# Patient Record
Sex: FEMALE | Race: WHITE
[De-identification: names, ages, dates, MRNs, and addresses within clinical notes are randomized per-mention and may not be internally consistent; named-entity substitution may affect disease eponyms.]

---

## 2017-12-14 ENCOUNTER — HOSPITAL ENCOUNTER (INPATIENT)
Dept: HOSPITAL 38 - CC.MS | Age: 82
LOS: 7 days | Discharge: HOME | DRG: 308 | End: 2017-12-21
Attending: GENERAL PRACTICE | Admitting: GENERAL PRACTICE
Payer: MEDICARE

## 2017-12-14 DIAGNOSIS — I48.0: Primary | ICD-10-CM

## 2017-12-14 DIAGNOSIS — I63.8: ICD-10-CM

## 2017-12-14 DIAGNOSIS — I10: ICD-10-CM

## 2017-12-14 DIAGNOSIS — R42: ICD-10-CM

## 2017-12-14 DIAGNOSIS — R53.1: ICD-10-CM

## 2017-12-14 DIAGNOSIS — E83.42: ICD-10-CM

## 2017-12-14 DIAGNOSIS — Z79.899: ICD-10-CM

## 2017-12-14 DIAGNOSIS — Z79.01: ICD-10-CM

## 2017-12-14 DIAGNOSIS — E78.5: ICD-10-CM

## 2017-12-14 LAB
CHLORIDE SERPL-SCNC: 107 MEQ/L (ref 98–106)
SODIUM SERPL-SCNC: 144 MEQ/L (ref 136–145)

## 2017-12-14 NOTE — PCM.SN
- Free Text/Narrative


Note: 





Head CT negative per radiologist report. Patient currently in atrial 

fibrillation. Pulse has improved since IV metoprolol dose. Will start coumadin 

10 mg STAT and recheck INR tomorrow. Will bridge with Lovenox 40 mg BID.

## 2017-12-15 LAB
CHLORIDE SERPL-SCNC: 110 MEQ/L (ref 98–106)
SODIUM SERPL-SCNC: 145 MEQ/L (ref 136–145)

## 2017-12-15 NOTE — PN
DATE:  12/15/2017

 

S:  This is an elderly female who came, apparently had a stroke down in [place],

came in to see me and she was in atrial fibrillation.  Telemetry during the

night showed rapid ventricular rate with some bradycardia.

 

O:  NECK:  Supple.

CHEST:  Clear.

CARDIAC:  Regular at present.

EXTREMITIES:  No edema.

 

ASSESSMENT:  CEREBROVASCULAR ACCIDENT, ATRIAL FIBRILLATION.  PENDING ARE CAT

SCANS AND THEN WE WILL START ANTICOAGULATION.  LOW ON MAGNESIUM, SO REPLENISH

THAT THIS MORNING.

 

GEORGE/SHILPA

DD:  12/15/2017 08:23:25

DT:  12/15/2017 08:36:28

Job #:  [id number]

## 2017-12-16 LAB
CHLORIDE SERPL-SCNC: 110 MEQ/L (ref 98–106)
SODIUM SERPL-SCNC: 144 MEQ/L (ref 136–145)

## 2017-12-16 NOTE — PCM.PN
- General Info


Date of Service: 12/16/17


Functional Status: Reports: Pain Controlled, Tolerating Diet, Ambulating, 

Urinating





- Review of Systems


General: Reports: No Symptoms


HEENT: Reports: No Symptoms


Pulmonary: Reports: No Symptoms


Cardiovascular: Reports: No Symptoms


Gastrointestinal: Reports: No Symptoms


Genitourinary: Reports: No Symptoms


Musculoskeletal: Reports: No Symptoms


Skin: Reports: No Symptoms


Neurological: Reports: No Symptoms


Psychiatric: Reports: No Symptoms





- Patient Data


Vitals - Most Recent: 


 Last Vital Signs











Temp  98.1 F   12/16/17 07:32


 


Pulse  64   12/16/17 07:34


 


Resp  20   12/16/17 07:32


 


BP  136/57 L  12/16/17 07:35


 


Pulse Ox  96   12/16/17 07:32











Weight - Most Recent: 144 lb 6.4 oz


I&O - Last 24 Hours: 


 Intake & Output











 12/15/17 12/16/17 12/16/17





 22:59 06:59 14:59


 


Intake Total   400


 


Balance   400











Lab Results Last 24 Hours: 


 Laboratory Results - last 24 hr











  12/16/17 12/16/17 12/16/17 Range/Units





  05:00 07:00 07:00 


 


WBC  3.9 L    (5.0-10.0)  10^3/uL


 


RBC  3.14 L    (4.00-5.50)  10^6/uL


 


Hgb  10.3 L    (12.0-16.0)  g/dL


 


Hct  31.1 L    (37.0-47.0)  %


 


MCV  99.0 H    (82.0-94.0)  fL


 


MCH  32.8 H    (27.0-32.0)  pg


 


MCHC  33.1    (33.0-38.0)  g/dL


 


RDW Coeff of Shree  12.9    (11.0-15.0)  %


 


Plt Count  121 L    (150-400)  10^3/uL


 


Neut % (Auto)  54.2    (35-85)  %


 


Lymph % (Auto)  26.4    (10-55)  %


 


Mono % (Auto)  12.9    (0-16)  %


 


Eos % (Auto)  5.7 H    (0-5)  %


 


Baso % (Auto)  0.8    (0-3)  %


 


Neut # (Auto)  2.10    (1.80-7.00)  10^3/uL


 


Lymph # (Auto)  1.02    (1.00-4.80)  10^3/uL


 


Mono # (Auto)  0.50    (0.00-0.80)  10^3/uL


 


Eos # (Auto)  0.22    (0.00-0.45)  10^3/uL


 


Baso # (Auto)  0.03    10^3/uL


 


PT   21.9 H   (9.7-12.3)  SEC


 


INR   1.99 H   (0.92-1.18)  


 


Sodium    144  (136-145)  mEq/L


 


Potassium    3.6  (3.5-5.0)  mEq/L


 


Chloride    110 H  ()  mEq/L


 


Carbon Dioxide    30  (21-32)  mmol/L


 


BUN    23 H  (7-18)  mg/dL


 


Creatinine    1.4 H  (0.6-1.0)  mg/dL


 


Est Cr Clr Drug Dosing    23.24  mL/min


 


Estimated GFR (MDRD)    36 L  (>=60)  mL/min


 


Glucose    99  (75-99)  mg/dL


 


Calcium    8.6  (8.4-10.1)  mg/dL











Med Orders - Current: 


 Current Medications





Acetaminophen (Tylenol)  650 mg PO Q4H PRN


   PRN Reason: Pain (Mild 1-3)/fever


Amlodipine Besylate (Norvasc)  5 mg PO BID [facility]


   Last Admin: 12/16/17 07:35 Dose:  5 mg


Atorvastatin Calcium (Lipitor)  40 mg PO BEDTIME [facility]


   Last Admin: 12/15/17 20:08 Dose:  40 mg


Calcium Carbonate/Glycine (Tums)  500 mg PO QID PRN


   PRN Reason: Dyspepsia


Enoxaparin Sodium (Lovenox)  30 mg SUBCUT BID [facility]


   Last Admin: 12/16/17 07:34 Dose:  30 mg


Magnesium Hydroxide (Milk Of Magnesia)  30 ml PO Q12H PRN


   PRN Reason: Constipation


Magnesium Oxide (Magnesium Oxide)  250 mg PO BIDM [facility]


   Last Admin: 12/16/17 07:34 Dose:  250 mg


Metoprolol Tartrate (Lopressor)  50 mg PO BID [facility]


   Last Admin: 12/16/17 07:34 Dose:  50 mg


Ondansetron HCl (Zofran)  4 mg IV Q6H PRN


   PRN Reason: Nausea/Vomiting


Pantoprazole Sodium (Protonix***)  40 mg PO 0700 [facility]


   Last Admin: 12/16/17 06:54 Dose:  40 mg


Sodium Chloride (Saline Flush)  10 ml FLUSH ASDIRECTED PRN


   PRN Reason: Keep Vein Open


   Last Admin: 12/15/17 20:09 Dose:  10 ml


Temazepam (Restoril)  15 mg PO BEDTIME PRN


   PRN Reason: Sleep


Warfarin Sodium (Coumadin)  5 mg PO DAILY@1200 [facility]





Discontinued Medications





Lactated Ringer's (Ringers, Lactated)  1,000 mls @ 100 mls/hr IV ONETIME ONE


   Stop: 12/15/17 01:59


   Last Admin: 12/14/17 16:29 Dose:  100 mls/hr


Magnesium Sulfate/Dextrose 2 (gm/ Premix)  200 mls @ 100 mls/hr IV ONETIME ONE


   Stop: 12/15/17 10:22


   Last Admin: 12/15/17 09:14 Dose:  100 mls/hr


Sodium Chloride (Normal Saline)  1,000 mls @ 75 mls/hr IV ASDIRECTED [facility]


Iopamidol (Isovue-370 (76%))  100 ml IVPUSH ONETIME ONE


   Stop: 12/14/17 16:07


   Last Admin: 12/14/17 16:20 Dose:  100 ml


Metoprolol Tartrate (Lopressor)  2.5 mg IVPUSH ONETIME ONE


   Stop: 12/14/17 14:43


   Last Admin: 12/14/17 15:24 Dose:  2.5 mg


Warfarin Sodium (Coumadin)  10 mg PO ONETIME ONE


   Stop: 12/14/17 17:49


   Last Admin: 12/14/17 20:00 Dose:  10 mg


Warfarin Sodium (Coumadin)  10 mg PO ONETIME ONE


   Stop: 12/15/17 08:42


   Last Admin: 12/15/17 09:14 Dose:  10 mg











- Exam


General: Alert, Oriented, Cooperative, No Acute Distress


Neck: Supple


Lungs: Clear to Auscultation, Normal Respiratory Effort


Cardiovascular: Regular Rate, Regular Rhythm, No Murmurs


GI/Abdominal Exam: Soft, Non-Tender, No Organomegaly, No Distention, No 

Abnormal Bruit, No Mass


Back Exam: Normal Inspection, Full Range of Motion


Extremities: Normal Inspection, Normal Range of Motion, Non-Tender, No Pedal 

Edema, Normal Capillary Refill


Peripheral Pulses: 2+: Radial (L), Radial (R), Posterior Tibial (L), Posterior 

Tibial (R)


Skin: Warm, Dry, Intact


Neurological: No New Focal Deficit, Normal Gait, Normal Speech, Strength Equal 

Bilateral, Sensation Intact, Cranial Nerves Intact


Psy/Mental Status: Alert, Normal Affect, Normal Mood





- Problem List Review


Problem List Initiated/Reviewed/Updated: Yes





- My Orders


Last 24 Hours: 


My Active Orders





12/16/17 07:00


MAGNESIUM [CHEM] Stat 





12/16/17 12:00


Warfarin [Coumadin]   5 mg PO DAILY@1200 





12/17/17 05:00


BMP [BASIC METABOLIC PANEL,BMP] [CHEM] DAILY 


CBC WITH AUTO DIFF [HEME] DAILY 


INR,PT,PROTHROMBIN TIME [COAG] DAILY 


MAGNESIUM [CHEM] Routine 





12/18/17 05:00


BMP [BASIC METABOLIC PANEL,BMP] [CHEM] DAILY 


CBC WITH AUTO DIFF [HEME] DAILY 


INR,PT,PROTHROMBIN TIME [COAG] DAILY 














- Plan


Plan:: 





This patient is an 85 year old female that was admitted for a-fib, stroke, 

generalized weakness. Patient today is alert and oriented. Patient denies any 

pain, ha, dizziness, n, v, d, f, cp, soa, abd pain, urinary/bowel changes. 

rash. Will continue to admit until therapeutic INR. Patient is currently NSR 

rate of 54. Patient plan is to see PCP on Monday. Patient is alert and oriented

, no unilateral weaknesses. Stable.

## 2017-12-17 LAB
CHLORIDE SERPL-SCNC: 111 MEQ/L (ref 98–106)
SODIUM SERPL-SCNC: 147 MEQ/L (ref 136–145)

## 2017-12-17 NOTE — PCM.PN
- General Info


Date of Service: 12/17/17


Functional Status: Reports: Pain Controlled, Tolerating Diet, Ambulating, 

Urinating





- Review of Systems


General: Reports: No Symptoms


HEENT: Reports: No Symptoms


Pulmonary: Reports: No Symptoms


Cardiovascular: Reports: No Symptoms


Gastrointestinal: Reports: No Symptoms


Genitourinary: Reports: No Symptoms


Musculoskeletal: Reports: No Symptoms


Skin: Reports: No Symptoms


Neurological: Reports: No Symptoms


Psychiatric: Reports: No Symptoms





- Patient Data


Vitals - Most Recent: 


 Last Vital Signs











Temp  97.2 F   12/17/17 07:36


 


Pulse  63   12/17/17 07:38


 


Resp  20   12/17/17 07:36


 


BP  130/55 L  12/17/17 07:39


 


Pulse Ox  96   12/17/17 07:36











Weight - Most Recent: 144 lb 6.4 oz


I&O - Last 24 Hours: 


 Intake & Output











 12/16/17 12/17/17 12/17/17





 22:59 06:59 14:59


 


Intake Total   600


 


Balance   600











Lab Results Last 24 Hours: 


 Laboratory Results - last 24 hr











  12/17/17 12/17/17 12/17/17 Range/Units





  07:30 07:30 07:30 


 


WBC  3.5 L    (5.0-10.0)  10^3/uL


 


RBC  3.31 L    (4.00-5.50)  10^6/uL


 


Hgb  10.7 L    (12.0-16.0)  g/dL


 


Hct  32.7 L    (37.0-47.0)  %


 


MCV  98.8 H    (82.0-94.0)  fL


 


MCH  32.3 H    (27.0-32.0)  pg


 


MCHC  32.7 L    (33.0-38.0)  g/dL


 


RDW Coeff of Shree  12.9    (11.0-15.0)  %


 


Plt Count  119 L    (150-400)  10^3/uL


 


Neut % (Auto)  47.7    (35-85)  %


 


Lymph % (Auto)  29.7    (10-55)  %


 


Mono % (Auto)  14.4    (0-16)  %


 


Eos % (Auto)  6.5 H    (0-5)  %


 


Baso % (Auto)  1.7    (0-3)  %


 


Neut # (Auto)  1.69 L    (1.80-7.00)  10^3/uL


 


Lymph # (Auto)  1.05    (1.00-4.80)  10^3/uL


 


Mono # (Auto)  0.51    (0.00-0.80)  10^3/uL


 


Eos # (Auto)  0.23    (0.00-0.45)  10^3/uL


 


Baso # (Auto)  0.06    10^3/uL


 


PT   40.1 H   (9.7-12.3)  SEC


 


INR   3.57 H   (0.92-1.18)  


 


Sodium    147 H  (136-145)  mEq/L


 


Potassium    4.2  (3.5-5.0)  mEq/L


 


Chloride    111 H  ()  mEq/L


 


Carbon Dioxide    30  (21-32)  mmol/L


 


BUN    21 H  (7-18)  mg/dL


 


Creatinine    1.5 H  (0.6-1.0)  mg/dL


 


Est Cr Clr Drug Dosing    21.69  mL/min


 


Estimated GFR (MDRD)    33 L  (>=60)  mL/min


 


Glucose    102 H  (75-99)  mg/dL


 


Calcium    8.7  (8.4-10.1)  mg/dL


 


Magnesium    1.8  (1.8-2.4)  mg/dL











Med Orders - Current: 


 Current Medications





Acetaminophen (Tylenol)  650 mg PO Q4H PRN


   PRN Reason: Pain (Mild 1-3)/fever


   Last Admin: 12/16/17 19:48 Dose:  650 mg


Amlodipine Besylate (Norvasc)  5 mg PO BID Count includes the [facility]


   Last Admin: 12/17/17 07:39 Dose:  5 mg


Atorvastatin Calcium (Lipitor)  40 mg PO BEDTIME Count includes the [facility]


   Last Admin: 12/16/17 19:48 Dose:  40 mg


Calcium Carbonate/Glycine (Tums)  500 mg PO QID PRN


   PRN Reason: Dyspepsia


Enoxaparin Sodium (Lovenox)  30 mg SUBCUT BID Count includes the [facility]


   Last Admin: 12/17/17 07:38 Dose:  30 mg


Sodium Chloride (Normal Saline)  500 mls @ 125 mls/hr IV .BOLUS Count includes the [facility]


   Last Admin: 12/17/17 12:13 Dose:  125 mls/hr


Magnesium Hydroxide (Milk Of Magnesia)  30 ml PO Q12H PRN


   PRN Reason: Constipation


Magnesium Oxide (Magnesium Oxide)  250 mg PO BIDM Count includes the [facility]


   Last Admin: 12/17/17 07:38 Dose:  250 mg


Metoprolol Tartrate (Lopressor)  50 mg PO BID Count includes the [facility]


   Last Admin: 12/17/17 07:38 Dose:  50 mg


Ondansetron HCl (Zofran)  4 mg IV Q6H PRN


   PRN Reason: Nausea/Vomiting


Pantoprazole Sodium (Protonix***)  40 mg PO 0700 Count includes the [facility]


   Last Admin: 12/17/17 06:28 Dose:  40 mg


Sodium Chloride (Saline Flush)  10 ml FLUSH ASDIRECTED PRN


   PRN Reason: Keep Vein Open


   Last Admin: 12/15/17 20:09 Dose:  10 ml


Temazepam (Restoril)  15 mg PO BEDTIME PRN


   PRN Reason: Sleep


   Last Admin: 12/16/17 19:48 Dose:  15 mg


Warfarin Sodium (Coumadin)  2.5 mg PO ONETIME Count includes the [facility]


   Last Admin: 12/17/17 12:11 Dose:  2.5 mg





Discontinued Medications





Lactated Ringer's (Ringers, Lactated)  1,000 mls @ 100 mls/hr IV ONETIME ONE


   Stop: 12/15/17 01:59


   Last Admin: 12/14/17 16:29 Dose:  100 mls/hr


Magnesium Sulfate/Dextrose 2 (gm/ Premix)  200 mls @ 100 mls/hr IV ONETIME ONE


   Stop: 12/15/17 10:22


   Last Admin: 12/15/17 09:14 Dose:  100 mls/hr


Sodium Chloride (Normal Saline)  1,000 mls @ 75 mls/hr IV ASDIRECTED [facility]


Iopamidol (Isovue-370 (76%))  100 ml IVPUSH ONETIME ONE


   Stop: 12/14/17 16:07


   Last Admin: 12/14/17 16:20 Dose:  100 ml


Metoprolol Tartrate (Lopressor)  2.5 mg IVPUSH ONETIME ONE


   Stop: 12/14/17 14:43


   Last Admin: 12/14/17 15:24 Dose:  2.5 mg


Warfarin Sodium (Coumadin)  10 mg PO ONETIME ONE


   Stop: 12/14/17 17:49


   Last Admin: 12/14/17 20:00 Dose:  10 mg


Warfarin Sodium (Coumadin)  10 mg PO ONETIME ONE


   Stop: 12/15/17 08:42


   Last Admin: 12/15/17 09:14 Dose:  10 mg


Warfarin Sodium (Coumadin)  5 mg PO DAILY@1200 Count includes the [facility]


   Last Admin: 12/16/17 11:59 Dose:  5 mg











- Exam


General: Alert, Oriented, Cooperative, No Acute Distress


Neck: Supple, Trachea Midline, No JVD


Lungs: Clear to Auscultation, Normal Respiratory Effort


Cardiovascular: Regular Rate, Regular Rhythm, No Murmurs


GI/Abdominal Exam: Soft, Non-Tender, No Organomegaly, No Distention


Back Exam: Normal Inspection, Full Range of Motion


Extremities: Normal Inspection, Normal Range of Motion, Non-Tender, No Pedal 

Edema, Normal Capillary Refill.  No: Pedal Edema


Peripheral Pulses: 2+: Radial (L), Radial (R), Posterior Tibial (L), Posterior 

Tibial (R), Dorsalis Pedis (L), Dorsalis Pedis (R)


Skin: Warm, Dry, Intact


Neurological: No New Focal Deficit, Normal Gait, Normal Speech


Psy/Mental Status: Alert, Normal Affect, Normal Mood





- Problem List Review


Problem List Initiated/Reviewed/Updated: Yes





- My Orders


Last 24 Hours: 


My Active Orders





12/17/17 11:45


Sodium Chloride 0.9% [Normal Saline] 500 ml IV .BOLUS 


Warfarin [Coumadin]   2.5 mg PO ONETIME 





12/18/17 05:00


BMP [BASIC METABOLIC PANEL,BMP] [CHEM] DAILY 


CBC WITH AUTO DIFF [HEME] DAILY 


INR,PT,PROTHROMBIN TIME [COAG] DAILY 














- Plan


Plan:: 


12/16/17


This patient is an 85 year old female that was admitted for a-fib, stroke, 

generalized weakness. Patient today is alert and oriented. Patient denies any 

pain, ha, dizziness, n, v, d, f, cp, soa, abd pain, urinary/bowel changes. 

rash. Will continue to admit until therapeutic INR. Patient is currently NSR 

rate of 54. Patient plan is to see PCP on Monday. Patient is alert and oriented

, no unilateral weaknesses. Stable. 





12/17/17


Patient today has no complaints. Patient is alert and oriented. Patient INR 

yesterday was 1.99, today is 3.57. Today I have held her coumadin 10mg and will 

give her 2.5mg coumadin today. Will recheck this tomorrow and allow PCP to see 

her to make further coumadin dosing determination. Her CR yesterday was 1.4, 

today 1.5. Her BUN 23 yesterday, today is 21. I have given her a 500ml NS. 

Patient reports that she drinks a lot of coffee and not much water. Patient 

denies any complaints. She denies ha, dizziness, n, v, d, f, cp, soa, abd pain, 

urinary/bowel changes. She is able to ambulate without difficulty. She is 

eating lunch now. Stable.

## 2017-12-18 LAB
CHLORIDE SERPL-SCNC: 111 MEQ/L (ref 98–106)
SODIUM SERPL-SCNC: 147 MEQ/L (ref 136–145)

## 2017-12-18 NOTE — PN
DATE:  12/18/2017

 

S:  Lynne Trammell came in with CVA secondary to atrial fibrillation.  CTA of

chest and head looked okay.  CT scan had looked pretty good.  Echo pending.

 

O:  On examination,

NECK:  Supple.

CHEST:  Clear.

CARDIAC:  Regular.

 

ASSESSMENT:  ATRIAL FIBRILLATION AND CEREBROVASCULAR ACCIDENT.

 

P:  Continue anticoagulation.  Nursing home placement.  We will do an anemia

workup on her.

 

GEORGE/SHILPA

DD:  12/18/2017 07:52:42

DT:  12/18/2017 08:18:44

Job #:  [id number]

## 2017-12-19 LAB
CHLORIDE SERPL-SCNC: 111 MEQ/L (ref 98–106)
SODIUM SERPL-SCNC: 145 MEQ/L (ref 136–145)

## 2017-12-20 LAB
CHLORIDE SERPL-SCNC: 110 MEQ/L (ref 98–106)
SODIUM SERPL-SCNC: 144 MEQ/L (ref 136–145)

## 2017-12-20 NOTE — PN
DATE:  12/20/2017

 

S:  Lynne Trammell is in CVA, atrial fibrillation.

 

O:  NECK:  Supple.

CHEST:  Clear.

CARDIAC:  Sounds are good.

 

ASSESSMENT:  ATRIAL FIBRILLATION WITH PROBABLE THROMBOEMBOLIC PHENOMENON FOR

CEREBROVASCULAR DISEASES.

 

P:  Continue anticoagulation and find out what we are going to do with her.

 

GEORGE/SHILPA

DD:  12/20/2017 08:40:18

DT:  12/20/2017 08:52:46

Job #:  [id number]

## 2017-12-20 NOTE — PN
DATE:  12/19/2017

 

S:  Lynne Trammell is in with atrial fibrillation, CVA, really good today,

says she feels better.  Still walking with PT.

 

O:  NECK:  Supple.

CHEST:  Clear.

CARDIAC:  Regular.  No edema.

 

ASSESSMENT:  PAROXYSMAL ATRIAL FIBRILLATION, PROBABLY THROMBOEMBOLIC

CEREBROVASCULAR ACCIDENT.

 

P:  Continue physical therapy and then disposition question.

 

GEORGE/SHILPA

DD:  12/19/2017 08:18:40

DT:  12/19/2017 09:00:58

Job #:  [id number]

## 2017-12-21 LAB
CHLORIDE SERPL-SCNC: 107 MEQ/L (ref 98–106)
SODIUM SERPL-SCNC: 143 MEQ/L (ref 136–145)

## 2018-12-10 ENCOUNTER — HOSPITAL ENCOUNTER (EMERGENCY)
Dept: HOSPITAL 38 - CC.ED | Age: 83
Discharge: NURSING, REHAB OR LONG-TERM CARE | End: 2018-12-10
Payer: MEDICARE

## 2018-12-10 DIAGNOSIS — Z79.899: ICD-10-CM

## 2018-12-10 DIAGNOSIS — Z79.01: ICD-10-CM

## 2018-12-10 DIAGNOSIS — E78.00: ICD-10-CM

## 2018-12-10 DIAGNOSIS — K52.9: Primary | ICD-10-CM

## 2018-12-10 DIAGNOSIS — I48.2: ICD-10-CM

## 2018-12-10 LAB
CHLORIDE SERPL-SCNC: 104 MEQ/L (ref 98–106)
SODIUM SERPL-SCNC: 143 MEQ/L (ref 136–145)

## 2018-12-10 PROCEDURE — 85610 PROTHROMBIN TIME: CPT

## 2018-12-10 PROCEDURE — 80053 COMPREHEN METABOLIC PANEL: CPT

## 2018-12-10 PROCEDURE — 99283 EMERGENCY DEPT VISIT LOW MDM: CPT

## 2018-12-10 PROCEDURE — 85025 COMPLETE CBC W/AUTO DIFF WBC: CPT

## 2018-12-10 PROCEDURE — 86140 C-REACTIVE PROTEIN: CPT

## 2018-12-10 PROCEDURE — 36415 COLL VENOUS BLD VENIPUNCTURE: CPT

## 2018-12-10 PROCEDURE — 81001 URINALYSIS AUTO W/SCOPE: CPT

## 2018-12-10 NOTE — EDM.PDOC
ED HPI GENERAL MEDICAL PROBLEM





- General


Chief Complaint: Gastrointestinal Problem


Stated Complaint: n/v/d


Time Seen by Provider: 12/10/18 22:00


Source of Information: Reports: Patient, Nursing Home Records


History Limitations: Reports: No Limitations





- History of Present Illness


INITIAL COMMENTS - FREE TEXT/NARRATIVE: 





at 0200 she started with vomiting and diarrhea and has had it on and off since.

  Last time that she vomited or diarrhea was about supper time.  She did keep 

her evening meds down.  She denies having any fever throughout the day. Does 

have some stomach cramping but less than previously.  She does reside in basic 

care at [facility].  


Onset: Sudden


Onset Date: 12/10/18


Onset Time: 02:00


Location: Reports: Abdomen


Quality: Reports: Ache


Treatments PTA: Reports: Other (see below) (pepto and immodium)





- Related Data


 Allergies











Allergy/AdvReac Type Severity Reaction Status Date / Time


 


No Known Allergies Allergy   Verified 12/10/18 21:41











Home Meds: 


 Home Meds





Metoprolol Tartrate [Lopressor] 50 mg PO BID 12/14/17 [History]


Pantoprazole Sodium 40 mg PO QAM 12/14/17 [History]


amLODIPine [Norvasc] 2.5 mg PO BID 12/14/17 [History]


atorvaSTATin [Lipitor] 40 mg PO BEDTIME 12/14/17 [History]


Magnesium Oxide 250 mg PO BIDM #30 tablet 12/21/17 [Rx]


Acetaminophen 650 mg PO Q4H 12/10/18 [History]


Loratadine 10 mg PO DAILY PRN 12/10/18 [History]


Triamterene/Hydrochlorothiazid [Dyazide 37.5-25] 1 cap PO DAILY 12/10/18 [

History]


Warfarin [Coumadin] 2.5 mg PO DAILY 12/10/18 [History]











Past Medical History


Cardiovascular History: Reports: Afib, High Cholesterol


Genitourinary History: Reports: Urinary Incontinence


OB/GYN History: Reports: Pregnancy


Neurological History: Reports: CVA


Other Neuro History: 11/2017


Hematologic History: Reports: B12 Deficiency





- Past Surgical History


GI Surgical History: Reports: Appendectomy, Cholecystectomy


Female  Surgical History: Reports: Hysterectomy





Social & Family History





- Family History


Family Medical History: Noncontributory





- Caffeine Use


Caffeine Use: Reports: None


Caffeine Use Comment: Decaf coffee





- Living Situation & Occupation


Living situation: Reports: Extended Care Facility


Occupation: Retired





ED ROS GENERAL





- Review of Systems


Review Of Systems: See Below


Constitutional: Reports: Weakness, Decreased Appetite.  Denies: Fever, Chills


HEENT: Reports: No Symptoms


Respiratory: Reports: No Symptoms


Cardiovascular: Reports: No Symptoms


GI/Abdominal: Reports: Diarrhea, Vomiting.  Denies: Black Stool, Bloody Stool


: Denies: Dysuria, Frequency


Skin: Denies: Rash


Neurological: Reports: Dizziness (did have earlier but denies any currently.)





ED EXAM, GI/ABD





- Physical Exam


Exam: See Below


Exam Limited By: No Limitations


General Appearance: Alert, WD/WN, No Apparent Distress


Ears: Normal Canal, Normal TMs


Nose: Normal Inspection, Normal Mucosa


Throat/Mouth: Normal Inspection, Normal Oropharynx, Normal Voice, No Airway 

Compromise


Head: Atraumatic, Normocephalic


Neck: Normal Inspection, Supple, Non-Tender, Full Range of Motion


Respiratory/Chest: No Respiratory Distress, Lungs Clear, Normal Breath Sounds


Cardiovascular: Tachycardia, Irregularly Irregular


GI/Abdominal Exam: Normal Bowel Sounds, Soft, Non-Tender, No Distention, No 

Mass.  No: Guarding, Rigid, Rebound


Back Exam: Normal Inspection


Extremities: No Pedal Edema, Normal Capillary Refill


Neurological: Alert, Oriented, Normal Cognition


Skin Exam: Warm, Dry, Intact





Course





- Vital Signs


Last Recorded V/S: 


 Last Vital Signs











Temp  99.7 F   12/10/18 21:49


 


Pulse  126 H  12/10/18 22:23


 


Resp  18   12/10/18 21:49


 


BP  108/67   12/10/18 21:49


 


Pulse Ox  99   12/10/18 21:49














- Orders/Labs/Meds


Labs: 


 Laboratory Tests











  12/10/18 12/10/18 12/10/18 Range/Units





  21:50 21:50 21:50 


 


WBC  4.8 L    (5.0-10.0)  10^3/uL


 


RBC  3.99 L    (4.00-5.50)  10^6/uL


 


Hgb  12.0    (12.0-16.0)  g/dL


 


Hct  37.1    (37.0-47.0)  %


 


MCV  93.0    (82.0-94.0)  fL


 


MCH  30.1    (27.0-32.0)  pg


 


MCHC  32.3 L    (33.0-38.0)  g/dL


 


RDW Coeff of Shree  13.1    (11.0-15.0)  %


 


Plt Count  127 L    (150-400)  10^3/uL


 


Neut % (Auto)  88.5 H    (35-85)  %


 


Lymph % (Auto)  4.2 L    (10-55)  %


 


Mono % (Auto)  7.1    (0-16)  %


 


Eos % (Auto)  0    (0-5)  %


 


Baso % (Auto)  0.2    (0-3)  %


 


Neut # (Auto)  4.24    (1.80-7.00)  10^3/uL


 


Lymph # (Auto)  0.20 L    (1.00-4.80)  10^3/uL


 


Mono # (Auto)  0.34    (0.00-0.80)  10^3/uL


 


Eos # (Auto)  0.00    (0.00-0.45)  10^3/uL


 


Baso # (Auto)  0.01    10^3/uL


 


PT   24.5 H   (9.7-12.3)  SEC


 


INR   2.52 H   (0.92-1.18)  


 


Sodium    143  (136-145)  mEq/L


 


Potassium    3.2 L  (3.5-5.0)  mEq/L


 


Chloride    104  ()  mEq/L


 


Carbon Dioxide    27  (21-32)  mmol/L


 


BUN    31 H  (7-18)  mg/dL


 


Creatinine    1.7 H  (0.6-1.0)  mg/dL


 


Est Cr Clr Drug Dosing    TNP  


 


Estimated GFR (MDRD)    28 L  (>=60)  mL/min


 


Glucose    128 H D  (75-99)  mg/dL


 


Calcium    8.5  (8.4-10.1)  mg/dL


 


Total Bilirubin    1.3 H  (0.0-1.0)  mg/dL


 


AST    26  (15-37)  U/L


 


ALT    29  (12-78)  U/L


 


Alkaline Phosphatase    33 L  ()  U/L


 


C-Reactive Protein    3.6 H  (0.2-0.8)  mg/dL


 


Total Protein    6.8  (6.4-8.2)  g/dL


 


Albumin    3.4  (3.4-5.0)  g/dL


 


Urine Color     (YELLOW)  


 


Urine Appearance     (CLEAR)  


 


Urine pH     (4.5-8.0)  


 


Ur Specific Gravity     (1.003-1.020)  


 


Urine Protein     (NEGATIVE)  mg/dL


 


Urine Glucose (UA)     (NEGATIVE)  mg/dL


 


Urine Ketones     (NEGATIVE)  mg/dL


 


Urine Occult Blood     (NEGATIVE)  


 


Urine Nitrite     (NEGATIVE)  


 


Urine Bilirubin     (NEGATIVE)  


 


Urine Urobilinogen     (0.2-1.0)  EU/dL


 


Ur Leukocyte Esterase     (NEGATIVE)  


 


Urine RBC     (0-5)  /HPF


 


Urine WBC     (0-5)  /HPF


 


Ur Epithelial Cells     (NOT SEEN)  /HPF


 


Urine Mucus     (NOT SEEN)  /HPF














  12/10/18 Range/Units





  22:15 


 


WBC   (5.0-10.0)  10^3/uL


 


RBC   (4.00-5.50)  10^6/uL


 


Hgb   (12.0-16.0)  g/dL


 


Hct   (37.0-47.0)  %


 


MCV   (82.0-94.0)  fL


 


MCH   (27.0-32.0)  pg


 


MCHC   (33.0-38.0)  g/dL


 


RDW Coeff of Shree   (11.0-15.0)  %


 


Plt Count   (150-400)  10^3/uL


 


Neut % (Auto)   (35-85)  %


 


Lymph % (Auto)   (10-55)  %


 


Mono % (Auto)   (0-16)  %


 


Eos % (Auto)   (0-5)  %


 


Baso % (Auto)   (0-3)  %


 


Neut # (Auto)   (1.80-7.00)  10^3/uL


 


Lymph # (Auto)   (1.00-4.80)  10^3/uL


 


Mono # (Auto)   (0.00-0.80)  10^3/uL


 


Eos # (Auto)   (0.00-0.45)  10^3/uL


 


Baso # (Auto)   10^3/uL


 


PT   (9.7-12.3)  SEC


 


INR   (0.92-1.18)  


 


Sodium   (136-145)  mEq/L


 


Potassium   (3.5-5.0)  mEq/L


 


Chloride   ()  mEq/L


 


Carbon Dioxide   (21-32)  mmol/L


 


BUN   (7-18)  mg/dL


 


Creatinine   (0.6-1.0)  mg/dL


 


Est Cr Clr Drug Dosing   


 


Estimated GFR (MDRD)   (>=60)  mL/min


 


Glucose   (75-99)  mg/dL


 


Calcium   (8.4-10.1)  mg/dL


 


Total Bilirubin   (0.0-1.0)  mg/dL


 


AST   (15-37)  U/L


 


ALT   (12-78)  U/L


 


Alkaline Phosphatase   ()  U/L


 


C-Reactive Protein   (0.2-0.8)  mg/dL


 


Total Protein   (6.4-8.2)  g/dL


 


Albumin   (3.4-5.0)  g/dL


 


Urine Color  Yellow  (YELLOW)  


 


Urine Appearance  Clear  (CLEAR)  


 


Urine pH  6.5  (4.5-8.0)  


 


Ur Specific Gravity  1.020  (1.003-1.020)  


 


Urine Protein  30 H  (NEGATIVE)  mg/dL


 


Urine Glucose (UA)  Negative  (NEGATIVE)  mg/dL


 


Urine Ketones  Trace H  (NEGATIVE)  mg/dL


 


Urine Occult Blood  Moderate H  (NEGATIVE)  


 


Urine Nitrite  Negative  (NEGATIVE)  


 


Urine Bilirubin  Negative  (NEGATIVE)  


 


Urine Urobilinogen  0.2  (0.2-1.0)  EU/dL


 


Ur Leukocyte Esterase  Negative  (NEGATIVE)  


 


Urine RBC  10-20 H  (0-5)  /HPF


 


Urine WBC  0-5  (0-5)  /HPF


 


Ur Epithelial Cells  Few H  (NOT SEEN)  /HPF


 


Urine Mucus  Few H  (NOT SEEN)  /HPF











Meds: 


Medications














Discontinued Medications














Generic Name Dose Route Start Last Admin





  Trade Name Freq  PRN Reason Stop Dose Admin


 


Ondansetron HCl  4 mg  12/10/18 21:43  12/10/18 21:51





  Zofran Odt  PO  12/10/18 21:44  4 mg





  ONETIME ONE   Administration





     





     





     





     














Departure





- Departure


Time of Disposition: 22:33


Disposition: DC/[facility]fer to [facility]


Condition: Good


Clinical Impression: 


 Gastroenteritis








- Discharge Information


*PRESCRIPTION DRUG MONITORING PROGRAM REVIEWED*: Not Applicable


*COPY OF PRESCRIPTION DRUG MONITORING REPORT IN PATIENT KIMBERLY: Not Applicable


Instructions:  Viral Gastroenteritis, Adult


Forms:  ED Department Discharge


Additional Instructions: 


sips of fluid as tolerated


Zofran ODT 4 mg as tolerated for the  nausea.


Avoid milk and milk products until 24 hours after diarrhea done


Restart solid foods as tolerated.


Recheck if not improving.





- Problem List & Annotations


(1) Gastroenteritis


SNOMED Code(s): 85623548


   Code(s): K52.9 - NONINFECTIVE GASTROENTERITIS AND COLITIS, UNSPECIFIED   

Status: Acute   Priority: High   Current Visit: Yes   





(2) A-fib


SNOMED Code(s): 37542406


   Code(s): I48.91 - UNSPECIFIED ATRIAL FIBRILLATION   Status: Chronic   

Priority: Low   Current Visit: No   


Qualifiers: 


   Atrial fibrillation type: chronic   Qualified Code(s): I48.2 - Chronic 

atrial fibrillation   





- Problem List Review


Problem List Initiated/Reviewed/Updated: Yes

## 2020-06-02 NOTE — PN
DATE:  06/02/2020

 

S:  Mrs. Trammell was admitted by [facility] for pelvic cellulitis.  It sounds like she

has in the recent week had a boil somewhere in the groin or perineal area that

was popped.  She suddenly started developing increased pain and redness

throughout the perineum and into the mons pubis area and she presented yesterday

with worsening pain.  She has a white count of over 20,000 and a CRP of almost

40.  She was hypokalemic on admit, and her creatinine was 2.2.  She did have a

supratherapeutic INR as well.  She has not spiked any significant temps, but she

did increase her atrial fibrillation rate, she has been running in the 130s.

Her blood pressures are fine.  She is saturating well.  Her lactic acid done

this morning was 1.1.

 

O:  On physical exam, the patient is pleasant and cooperative.  She does not

appear in distress unless you examine in her perineal area.  She has redness

extending all the way from the perineum up into the mons labia and mons pubis

bilaterally.  She has a thick indurated and red cellulitis.  I do not see any

obvious boil or masses.

 

ASSESSMENT:

1. PELVIC CELLULITIS.

2. SUPRATHERAPEUTIC INR.

3. HYPOKALEMIA.

4. CHRONIC RENAL INSUFFICIENCY.

 

P:  We will add potassium chloride to her bag and correct her potassium.  Her

magnesium level was normal.  INR is 9.42 and we will discontinue to hold her

Coumadin.  She does not show any signs of active bleeding.  Continue to follow

her labs closely.  Blood pressures are fine, and she remains on IV fluids,

little concerned about her tachycardia from her atrial fibrillation.  We will

continue to monitor that this morning and we may need to give her a low-dose

diltiazem.  I am going to try to get a hold of Radiology and see if we can get a

pelvic CT.  If this would show an abscess here, she would need surgical

consultation.

 

MITCHELL/SHILPA

DD:  06/02/2020 09:45:35

DT:  06/02/2020 12:22:46

Job #:  [id number]

## 2020-06-02 NOTE — DISCH
ADMISSION DIAGNOSES:

1. Cellulitis.

2. Chronic atrial fibrillation.

3. Supratherapeutic INR.

 

DISCHARGE DIAGNOSIS:

1. INDRA GANGRENE.

2. ATRIAL FIBRILLATION.

3. HYPERTENSION.

4. HYPERLIPIDEMIA.

 

HISTORY:  The patient is an 88-year-old female who resides at an assisted living

facility attached to [facility] in [place], [place].  Within the last week, she apparently had a boil or a cyst-like spot in

her left groin, which was expressed without any trouble.  Over the next 5 to 6

days, she started having increased redness in the groin and up into the labial

area and now up into the suprapubic region.  She was evaluated by Naomi Campbell on the date of admit, felt to have cellulitis since with an elevated

white count and CRP of 37.  She was admitted for IV antibiotics.  At that time,

her lactic acid level was normal.  She was hypokalemic at 3.3 and had an INR

over 10.  She was given vitamin K, started on vancomycin and Zosyn.

 

HOSPITAL COURSE:  The patient clinically has done fine other than pain during

her stay.  She has not been hypotensive.  She is little tachycardic with her

AFib running in the low teens.  She does have potassium replaced into her IV.

When I evaluated her, she had marked induration and discomfort all the way

through the mons pubis down into both labia and into the perineal area which was

concerning for possible abscess or Indra gangrene.  I did talk to Radiology

about a CT scan without contrast to see if we could see evidence of this.  This

was accomplished and they feel that this is the case.  I was able to talk to Dr. Roland on-call hospitalist at [facility], who agrees to accept the patient on

transfer for ongoing IV antibiotic care and potential surgical intervention.

Due to her elevated INR, we will give her another 5 of vitamin K prior to her

leaving.  Because she is going \[facility]", her IV potassium will be discontinued and

she will be just kept on normal saline.  She will go __________ ground to direct

admit at [facility] in [place].

 

COMPLICATIONS:  During her stay were none.

 

CONSULTATIONS:  None.

 

PROCEDURES:  CT pelvis without contrast.

 

DISPOSITION:  \[facility]\"" ground transfer to [facility], direct admit, Dr. Roland.

 

MITCHELL/SHILPA

DD:  06/02/2020 12:32:59

DT:  06/02/2020 13:18:12

Job #:  [id number]

## 2025-02-04 NOTE — DISCH
Subjective   The ABCs of the Annual Wellness Visit  Medicare Wellness Visit      America Crews is a 76 y.o. patient who presents for a Medicare Wellness Visit.    The following portions of the patient's history were reviewed and   updated as appropriate: allergies, current medications, past family history, past medical history, past social history, past surgical history, and problem list.    Compared to one year ago, the patient's physical   health is the same.  Compared to one year ago, the patient's mental   health is the same.    Recent Hospitalizations:  She was not admitted to the hospital during the last year.     Current Medical Providers:  Patient Care Team:  Naz Lubin MD as PCP - General (Family Medicine)  Morgan Valdez MD as Referring Physician (Breast Surgery)  Urbano Nettles MD as Consulting Physician (Hematology and Oncology)  Chino Flood MD as Consulting Physician (Hematology and Oncology)    Outpatient Medications Prior to Visit   Medication Sig Dispense Refill    Elderberry 575 MG/5ML syrup Take  by mouth.      multivitamin (THERAGRAN) tablet tablet Take  by mouth Daily.      albuterol sulfate  (90 Base) MCG/ACT inhaler Inhale 2 puffs Every 4 (Four) Hours As Needed for Shortness of Air. (Patient not taking: Reported on 2/4/2025) 8 g 0    Multiple Vitamins-Minerals (Emergen-C Vitamin C) pack Take  by mouth. (Patient not taking: Reported on 2/4/2025)       No facility-administered medications prior to visit.     No opioid medication identified on active medication list. I have reviewed chart for other potential  high risk medication/s and harmful drug interactions in the elderly.      Aspirin is not on active medication list.  Aspirin use is not indicated based on review of current medical condition/s. Risk of harm outweighs potential benefits.  .    Patient Active Problem List   Diagnosis    Breast cancer    Lymphedema of upper extremity    Medicare annual wellness  HOSPITAL COURSE:  Lynne Trammell came in with a history of CVA with

atrial fibrillation.  She was admitted to the hospital and started on

anticoagulation and responded nicely.  At the time of discharge, she had

converted.  She had no further deficit from the CVA.  I did do a CTA of her head

and neck, which looked good.  CAT scan of the head showed minimal residual from

the stroke.  Echocardiogram showed no significant problems.  Lab here in the

hospital; hemoglobin was down a little bit from 10.3, but it went back to 11.

INR became elevated, we held, and at the time of discharge, it was adequate.

Panel 8 looks good other than the creatinine mildly elevated, which is long

term.  Urinalysis looks good.

 

DISPOSITION:  The patient now discharged home, be admitted to basic care at

[facility] next Tuesday.  I will see her back in the

clinic next Thursday for an INR.

 

DISCHARGE MEDICATIONS:  Include home meds plus Coumadin 2 mg daily.

 

DISCHARGE DIAGNOSIS:

1. ATRIAL FIBRILLATION.

2. CEREBROVASCULAR ACCIDENT.

3. HYPERTENSION.

4. HYPERLIPIDEMIA.

 

GEORGE/SHILPA

DD:  12/21/2017 07:12:26

DT:  12/21/2017 07:42:54

Job #:  [id number] "visit, subsequent    Screen for colon cancer    Elevated BP without diagnosis of hypertension     Advance Care Planning Advance Directive is on file.  ACP discussion was held with the patient during this visit. Patient has an advance directive in EMR which is still valid.             Objective   Vitals:    25 0958   BP: 144/78   Pulse: 76   Temp: 97.3 °F (36.3 °C)   SpO2: 98%   Weight: 75.3 kg (166 lb)   Height: 154.9 cm (61\")   PainSc: 0-No pain       Estimated body mass index is 31.37 kg/m² as calculated from the following:    Height as of this encounter: 154.9 cm (61\").    Weight as of this encounter: 75.3 kg (166 lb).    BMI is >= 30 and <35. (Class 1 Obesity). The following options were offered after discussion;: exercise counseling/recommendations, nutrition counseling/recommendations, and continue with weight watchers.           Does the patient have evidence of cognitive impairment? No                                                                                               Health  Risk Assessment    Smoking Status:  Social History     Tobacco Use   Smoking Status Never    Passive exposure: Never   Smokeless Tobacco Never     Alcohol Consumption:  Social History     Substance and Sexual Activity   Alcohol Use Yes    Comment: Seldom       Fall Risk Screen  STEADI Fall Risk Assessment was completed, and patient is at LOW risk for falls.Assessment completed on:2025    Depression Screening   Little interest or pleasure in doing things? Not at all   Feeling down, depressed, or hopeless? Not at all   PHQ-2 Total Score 0      Health Habits and Functional and Cognitive Screenin/28/2025     8:30 AM   Functional & Cognitive Status   Do you have difficulty preparing food and eating? No    Do you have difficulty bathing yourself, getting dressed or grooming yourself? No    Do you have difficulty using the toilet? No    Do you have difficulty moving around from place to place? No    Do you have " trouble with steps or getting out of a bed or a chair? No    Current Diet Well Balanced Diet    Dental Exam Up to date    Eye Exam Up to date    Exercise (times per week) 2 times per week    Current Exercises Include Dmitriy Chi    Do you need help using the phone?  No    Are you deaf or do you have serious difficulty hearing?  No    Do you need help to go to places out of walking distance? No    Do you need help shopping? No    Do you need help preparing meals?  No    Do you need help with housework?  No    Do you need help with laundry? No    Do you need help taking your medications? No    Do you need help managing money? No    Do you ever drive or ride in a car without wearing a seat belt? No    Have you felt unusual stress, anger or loneliness in the last month? No    Who do you live with? Alone    If you need help, do you have trouble finding someone available to you? No    Have you been bothered in the last four weeks by sexual problems? No    Do you have difficulty concentrating, remembering or making decisions? No        Patient-reported           Age-appropriate Screening Schedule:  Refer to the list below for future screening recommendations based on patient's age, sex and/or medical conditions. Orders for these recommended tests are listed in the plan section. The patient has been provided with a written plan.    Health Maintenance List  Health Maintenance   Topic Date Due    BMI FOLLOWUP  08/10/2024    COVID-19 Vaccine (5 - 2024-25 season) 09/01/2024    ANNUAL WELLNESS VISIT  01/31/2025    DXA SCAN  10/02/2025    COLORECTAL CANCER SCREENING  06/14/2026    TDAP/TD VACCINES (2 - Td or Tdap) 06/13/2027    RSV Vaccine - Adults  Completed    INFLUENZA VACCINE  Completed    Pneumococcal Vaccine 65+  Completed    HEPATITIS C SCREENING  Discontinued    ZOSTER VACCINE  Discontinued                                                                                                                                                 CMS Preventative Services Quick Reference  Risk Factors Identified During Encounter  Immunizations Discussed/Encouraged:  Up-to-date  Dental Screening Recommended  Vision Screening Recommended    The above risks/problems have been discussed with the patient.  Pertinent information has been shared with the patient in the After Visit Summary.  An After Visit Summary and PPPS were made available to the patient.    Follow Up:   Next Medicare Wellness visit to be scheduled in 1 year.     Assessment & Plan  Medicare annual wellness visit, subsequent  Patient was counseled in regards to maintaining a healthy lifestyle, rich in whole grains, fruits and vegetables. Limit high saturated fats and processed sugars. Maintain an active lifestyle to promote overall health and well being.          Screening for deficiency anemia    Orders:    CBC Auto Differential    Screening for ischemic heart disease    Orders:    Comprehensive Metabolic Panel    ORDER: Hemoglobin A1c    Lipid Panel    Elevated blood sugar level    Orders:    ORDER: Hemoglobin A1c    Onychomycosis    Orders:    Ambulatory Referral to Podiatry    Ingrown toenail    Orders:    Ambulatory Referral to Podiatry    Postmenopausal    Orders:    DEXA Bone Density Axial; Future    76-year-old female with history of breast cancer and left upper extremity lymphedema related to this who presents for annual wellness exam.    Blood pressure is well-controlled at home.  She does check it intermittently and does not notice readings greater than 140/90.    She has dealt with onychomycosis of toenails and has been on terbinafine in the past without relief.  She recently injured her toe and has had some pain around the nail since that time.    Up-to-date on cancer screenings.  DEXA scan due in October.  Colonoscopy will be due next year.  Up-to-date on vaccines.    Follow Up:   No follow-ups on file.